# Patient Record
Sex: MALE | Race: WHITE
[De-identification: names, ages, dates, MRNs, and addresses within clinical notes are randomized per-mention and may not be internally consistent; named-entity substitution may affect disease eponyms.]

---

## 2019-11-01 ENCOUNTER — HOSPITAL ENCOUNTER (EMERGENCY)
Dept: HOSPITAL 95 - ER | Age: 29
LOS: 1 days | Discharge: HOME | End: 2019-11-02
Payer: SELF-PAY

## 2019-11-01 VITALS — WEIGHT: 159.99 LBS | HEIGHT: 67 IN | BODY MASS INDEX: 25.11 KG/M2

## 2019-11-01 DIAGNOSIS — R11.0: ICD-10-CM

## 2019-11-01 DIAGNOSIS — Z88.0: ICD-10-CM

## 2019-11-01 DIAGNOSIS — F41.9: ICD-10-CM

## 2019-11-01 DIAGNOSIS — Z79.899: ICD-10-CM

## 2019-11-01 DIAGNOSIS — F17.210: ICD-10-CM

## 2019-11-01 DIAGNOSIS — L03.113: Primary | ICD-10-CM

## 2019-11-01 DIAGNOSIS — Z88.2: ICD-10-CM

## 2019-12-15 ENCOUNTER — HOSPITAL ENCOUNTER (INPATIENT)
Dept: HOSPITAL 95 - ER | Age: 29
LOS: 3 days | Discharge: HOME | DRG: 580 | End: 2019-12-18
Attending: HOSPITALIST | Admitting: HOSPITALIST
Payer: COMMERCIAL

## 2019-12-15 VITALS — WEIGHT: 162.48 LBS | HEIGHT: 67.01 IN | BODY MASS INDEX: 25.5 KG/M2

## 2019-12-15 DIAGNOSIS — L02.415: ICD-10-CM

## 2019-12-15 DIAGNOSIS — F31.9: ICD-10-CM

## 2019-12-15 DIAGNOSIS — F90.9: ICD-10-CM

## 2019-12-15 DIAGNOSIS — L02.414: Primary | ICD-10-CM

## 2019-12-15 DIAGNOSIS — B95.0: ICD-10-CM

## 2019-12-15 DIAGNOSIS — L03.114: ICD-10-CM

## 2019-12-15 DIAGNOSIS — B95.62: ICD-10-CM

## 2019-12-15 DIAGNOSIS — F17.210: ICD-10-CM

## 2019-12-15 DIAGNOSIS — L03.115: ICD-10-CM

## 2019-12-15 LAB
ALBUMIN SERPL BCP-MCNC: 3 G/DL (ref 3.4–5)
ALBUMIN/GLOB SERPL: 0.5 {RATIO} (ref 0.8–1.8)
ALT SERPL W P-5'-P-CCNC: 15 U/L (ref 12–78)
ANION GAP SERPL CALCULATED.4IONS-SCNC: 6 MMOL/L (ref 6–16)
AST SERPL W P-5'-P-CCNC: 14 U/L (ref 12–37)
BASOPHILS # BLD AUTO: 0.07 K/MM3 (ref 0–0.23)
BASOPHILS NFR BLD AUTO: 0 % (ref 0–2)
BILIRUB SERPL-MCNC: 0.4 MG/DL (ref 0.1–1)
BUN SERPL-MCNC: 11 MG/DL (ref 8–24)
CALCIUM SERPL-MCNC: 9 MG/DL (ref 8.5–10.1)
CHLORIDE SERPL-SCNC: 104 MMOL/L (ref 98–108)
CO2 SERPL-SCNC: 25 MMOL/L (ref 21–32)
CREAT SERPL-MCNC: 0.74 MG/DL (ref 0.6–1.2)
DEPRECATED RDW RBC AUTO: 43 FL (ref 35.1–46.3)
EOSINOPHIL # BLD AUTO: 0.21 K/MM3 (ref 0–0.68)
EOSINOPHIL NFR BLD AUTO: 1 % (ref 0–6)
ERYTHROCYTE [DISTWIDTH] IN BLOOD BY AUTOMATED COUNT: 13.9 % (ref 11.7–14.2)
GLOBULIN SER CALC-MCNC: 5.7 G/DL (ref 2.2–4)
GLUCOSE SERPL-MCNC: 94 MG/DL (ref 70–99)
HCT VFR BLD AUTO: 36 % (ref 37–53)
HGB BLD-MCNC: 11.8 G/DL (ref 13.5–17.5)
IMM GRANULOCYTES # BLD AUTO: 0.2 K/MM3 (ref 0–0.1)
IMM GRANULOCYTES NFR BLD AUTO: 1 % (ref 0–1)
LYMPHOCYTES # BLD AUTO: 2.58 K/MM3 (ref 0.84–5.2)
LYMPHOCYTES NFR BLD AUTO: 12 % (ref 21–46)
MCHC RBC AUTO-ENTMCNC: 32.8 G/DL (ref 31.5–36.5)
MCV RBC AUTO: 86 FL (ref 80–100)
MONOCYTES # BLD AUTO: 1.84 K/MM3 (ref 0.16–1.47)
MONOCYTES NFR BLD AUTO: 9 % (ref 4–13)
NEUTROPHILS # BLD AUTO: 16.21 K/MM3 (ref 1.96–9.15)
NEUTROPHILS NFR BLD AUTO: 77 % (ref 41–73)
NRBC # BLD AUTO: 0 K/MM3 (ref 0–0.02)
NRBC BLD AUTO-RTO: 0 /100 WBC (ref 0–0.2)
PLATELET # BLD AUTO: 407 K/MM3 (ref 150–400)
POTASSIUM SERPL-SCNC: 4.2 MMOL/L (ref 3.5–5.5)
PROT SERPL-MCNC: 8.7 G/DL (ref 6.4–8.2)
SODIUM SERPL-SCNC: 135 MMOL/L (ref 136–145)

## 2019-12-15 PROCEDURE — C1751 CATH, INF, PER/CENT/MIDLINE: HCPCS

## 2019-12-16 LAB
AMPHETAMINES UR-MCNC: DETECTED UG/L
BASOPHILS # BLD AUTO: 0.05 K/MM3 (ref 0–0.23)
BASOPHILS NFR BLD AUTO: 0 % (ref 0–2)
CANNABINOIDS UR QL: DETECTED
DEPRECATED RDW RBC AUTO: 44.5 FL (ref 35.1–46.3)
EOSINOPHIL # BLD AUTO: 0.14 K/MM3 (ref 0–0.68)
EOSINOPHIL NFR BLD AUTO: 1 % (ref 0–6)
ERYTHROCYTE [DISTWIDTH] IN BLOOD BY AUTOMATED COUNT: 13.9 % (ref 11.7–14.2)
HCT VFR BLD AUTO: 31.6 % (ref 37–53)
HGB BLD-MCNC: 10.2 G/DL (ref 13.5–17.5)
IMM GRANULOCYTES # BLD AUTO: 0.1 K/MM3 (ref 0–0.1)
IMM GRANULOCYTES NFR BLD AUTO: 1 % (ref 0–1)
LYMPHOCYTES # BLD AUTO: 2.55 K/MM3 (ref 0.84–5.2)
LYMPHOCYTES NFR BLD AUTO: 20 % (ref 21–46)
MCHC RBC AUTO-ENTMCNC: 32.3 G/DL (ref 31.5–36.5)
MCV RBC AUTO: 86 FL (ref 80–100)
MONOCYTES # BLD AUTO: 1.46 K/MM3 (ref 0.16–1.47)
MONOCYTES NFR BLD AUTO: 12 % (ref 4–13)
NEUTROPHILS # BLD AUTO: 8.29 K/MM3 (ref 1.96–9.15)
NEUTROPHILS NFR BLD AUTO: 66 % (ref 41–73)
NRBC # BLD AUTO: 0 K/MM3 (ref 0–0.02)
NRBC BLD AUTO-RTO: 0 /100 WBC (ref 0–0.2)
OPIATES UR-MCNC: DETECTED NG/ML
PLATELET # BLD AUTO: 336 K/MM3 (ref 150–400)
VANCOMYCIN TROUGH SERPL-MCNC: 9.7 UG/ML (ref 5–10)

## 2019-12-16 PROCEDURE — 0JDH0ZZ EXTRACTION OF LEFT LOWER ARM SUBCUTANEOUS TISSUE AND FASCIA, OPEN APPROACH: ICD-10-PCS | Performed by: ORTHOPAEDIC SURGERY

## 2019-12-17 LAB
ALBUMIN SERPL BCP-MCNC: 2 G/DL (ref 3.4–5)
ANION GAP SERPL CALCULATED.4IONS-SCNC: 5 MMOL/L (ref 6–16)
BASOPHILS # BLD AUTO: 0.04 K/MM3 (ref 0–0.23)
BASOPHILS NFR BLD AUTO: 1 % (ref 0–2)
BUN SERPL-MCNC: 14 MG/DL (ref 8–24)
CALCIUM SERPL-MCNC: 8 MG/DL (ref 8.5–10.1)
CHLORIDE SERPL-SCNC: 113 MMOL/L (ref 98–108)
CO2 SERPL-SCNC: 25 MMOL/L (ref 21–32)
CREAT SERPL-MCNC: 0.72 MG/DL (ref 0.6–1.2)
DEPRECATED RDW RBC AUTO: 45.9 FL (ref 35.1–46.3)
EOSINOPHIL # BLD AUTO: 0.2 K/MM3 (ref 0–0.68)
EOSINOPHIL NFR BLD AUTO: 2 % (ref 0–6)
ERYTHROCYTE [DISTWIDTH] IN BLOOD BY AUTOMATED COUNT: 14.4 % (ref 11.7–14.2)
GLUCOSE SERPL-MCNC: 81 MG/DL (ref 70–99)
HCT VFR BLD AUTO: 29.5 % (ref 37–53)
HGB BLD-MCNC: 9.3 G/DL (ref 13.5–17.5)
IMM GRANULOCYTES # BLD AUTO: 0.11 K/MM3 (ref 0–0.1)
IMM GRANULOCYTES NFR BLD AUTO: 1 % (ref 0–1)
LYMPHOCYTES # BLD AUTO: 3.29 K/MM3 (ref 0.84–5.2)
LYMPHOCYTES NFR BLD AUTO: 38 % (ref 21–46)
MCHC RBC AUTO-ENTMCNC: 31.5 G/DL (ref 31.5–36.5)
MCV RBC AUTO: 88 FL (ref 80–100)
MONOCYTES # BLD AUTO: 1.02 K/MM3 (ref 0.16–1.47)
MONOCYTES NFR BLD AUTO: 12 % (ref 4–13)
NEUTROPHILS # BLD AUTO: 4.02 K/MM3 (ref 1.96–9.15)
NEUTROPHILS NFR BLD AUTO: 46 % (ref 41–73)
NRBC # BLD AUTO: 0 K/MM3 (ref 0–0.02)
NRBC BLD AUTO-RTO: 0 /100 WBC (ref 0–0.2)
PHOSPHATE SERPL-MCNC: 3.9 MG/DL (ref 2.5–4.9)
PLATELET # BLD AUTO: 297 K/MM3 (ref 150–400)
POTASSIUM SERPL-SCNC: 4.2 MMOL/L (ref 3.5–5.5)
SODIUM SERPL-SCNC: 143 MMOL/L (ref 136–145)
VANCOMYCIN TROUGH SERPL-MCNC: 26.3 UG/ML (ref 5–10)

## 2019-12-20 ENCOUNTER — HOSPITAL ENCOUNTER (EMERGENCY)
Dept: HOSPITAL 95 - ER | Age: 29
Discharge: HOME | End: 2019-12-20
Payer: COMMERCIAL

## 2019-12-20 VITALS — BODY MASS INDEX: 23.54 KG/M2 | WEIGHT: 150 LBS | HEIGHT: 67 IN

## 2019-12-20 DIAGNOSIS — L03.116: Primary | ICD-10-CM

## 2019-12-20 DIAGNOSIS — Z79.899: ICD-10-CM

## 2019-12-20 DIAGNOSIS — Z88.2: ICD-10-CM

## 2019-12-20 DIAGNOSIS — L03.115: ICD-10-CM

## 2019-12-20 DIAGNOSIS — Z88.0: ICD-10-CM

## 2019-12-20 DIAGNOSIS — F17.210: ICD-10-CM

## 2019-12-20 LAB
ALBUMIN SERPL BCP-MCNC: 2.8 G/DL (ref 3.4–5)
ALBUMIN/GLOB SERPL: 0.5 {RATIO} (ref 0.8–1.8)
ALT SERPL W P-5'-P-CCNC: 24 U/L (ref 12–78)
ANION GAP SERPL CALCULATED.4IONS-SCNC: 4 MMOL/L (ref 6–16)
AST SERPL W P-5'-P-CCNC: 18 U/L (ref 12–37)
BASOPHILS # BLD AUTO: 0.07 K/MM3 (ref 0–0.23)
BASOPHILS NFR BLD AUTO: 1 % (ref 0–2)
BILIRUB SERPL-MCNC: 0.1 MG/DL (ref 0.1–1)
BUN SERPL-MCNC: 15 MG/DL (ref 8–24)
CALCIUM SERPL-MCNC: 8.6 MG/DL (ref 8.5–10.1)
CHLORIDE SERPL-SCNC: 105 MMOL/L (ref 98–108)
CO2 SERPL-SCNC: 27 MMOL/L (ref 21–32)
CREAT SERPL-MCNC: 0.74 MG/DL (ref 0.6–1.2)
DEPRECATED RDW RBC AUTO: 44.8 FL (ref 35.1–46.3)
EOSINOPHIL # BLD AUTO: 0.28 K/MM3 (ref 0–0.68)
EOSINOPHIL NFR BLD AUTO: 3 % (ref 0–6)
ERYTHROCYTE [DISTWIDTH] IN BLOOD BY AUTOMATED COUNT: 14.1 % (ref 11.7–14.2)
GLOBULIN SER CALC-MCNC: 5.3 G/DL (ref 2.2–4)
GLUCOSE SERPL-MCNC: 93 MG/DL (ref 70–99)
HCT VFR BLD AUTO: 31.8 % (ref 37–53)
HGB BLD-MCNC: 10.4 G/DL (ref 13.5–17.5)
IMM GRANULOCYTES # BLD AUTO: 0.19 K/MM3 (ref 0–0.1)
IMM GRANULOCYTES NFR BLD AUTO: 2 % (ref 0–1)
LYMPHOCYTES # BLD AUTO: 2.8 K/MM3 (ref 0.84–5.2)
LYMPHOCYTES NFR BLD AUTO: 25 % (ref 21–46)
MCHC RBC AUTO-ENTMCNC: 32.7 G/DL (ref 31.5–36.5)
MCV RBC AUTO: 87 FL (ref 80–100)
MONOCYTES # BLD AUTO: 1.15 K/MM3 (ref 0.16–1.47)
MONOCYTES NFR BLD AUTO: 10 % (ref 4–13)
NEUTROPHILS # BLD AUTO: 6.83 K/MM3 (ref 1.96–9.15)
NEUTROPHILS NFR BLD AUTO: 60 % (ref 41–73)
NRBC # BLD AUTO: 0 K/MM3 (ref 0–0.02)
NRBC BLD AUTO-RTO: 0 /100 WBC (ref 0–0.2)
PLATELET # BLD AUTO: 446 K/MM3 (ref 150–400)
POTASSIUM SERPL-SCNC: 4.1 MMOL/L (ref 3.5–5.5)
PROT SERPL-MCNC: 8.1 G/DL (ref 6.4–8.2)
SODIUM SERPL-SCNC: 136 MMOL/L (ref 136–145)
TROPONIN I SERPL-MCNC: <0.015 NG/ML (ref 0–0.04)

## 2020-02-18 ENCOUNTER — HOSPITAL ENCOUNTER (EMERGENCY)
Dept: HOSPITAL 95 - ER | Age: 30
Discharge: LEFT BEFORE DISCHARGE | End: 2020-02-18
Payer: COMMERCIAL

## 2020-02-18 ENCOUNTER — HOSPITAL ENCOUNTER (EMERGENCY)
Dept: HOSPITAL 95 - ER | Age: 30
Discharge: HOME | End: 2020-02-18
Payer: COMMERCIAL

## 2020-02-18 VITALS — BODY MASS INDEX: 26.68 KG/M2 | WEIGHT: 170 LBS | HEIGHT: 67 IN

## 2020-02-18 VITALS — HEIGHT: 67 IN | WEIGHT: 170 LBS | BODY MASS INDEX: 26.68 KG/M2

## 2020-02-18 DIAGNOSIS — Z53.21: Primary | ICD-10-CM

## 2020-02-18 DIAGNOSIS — Z88.2: ICD-10-CM

## 2020-02-18 DIAGNOSIS — L03.115: Primary | ICD-10-CM

## 2020-02-18 DIAGNOSIS — F43.10: ICD-10-CM

## 2020-02-18 DIAGNOSIS — F17.210: ICD-10-CM

## 2020-02-18 DIAGNOSIS — F31.9: ICD-10-CM

## 2020-02-18 DIAGNOSIS — Z88.0: ICD-10-CM

## 2020-02-18 DIAGNOSIS — F41.9: ICD-10-CM

## 2020-02-22 ENCOUNTER — HOSPITAL ENCOUNTER (INPATIENT)
Dept: HOSPITAL 95 - ER | Age: 30
LOS: 5 days | Discharge: HOME | DRG: 572 | End: 2020-02-27
Attending: HOSPITALIST | Admitting: HOSPITALIST
Payer: COMMERCIAL

## 2020-02-22 VITALS — WEIGHT: 177.01 LBS | BODY MASS INDEX: 27.78 KG/M2 | HEIGHT: 67.01 IN

## 2020-02-22 DIAGNOSIS — Z88.2: ICD-10-CM

## 2020-02-22 DIAGNOSIS — F43.10: ICD-10-CM

## 2020-02-22 DIAGNOSIS — F31.9: ICD-10-CM

## 2020-02-22 DIAGNOSIS — A49.02: ICD-10-CM

## 2020-02-22 DIAGNOSIS — L03.115: ICD-10-CM

## 2020-02-22 DIAGNOSIS — Z88.0: ICD-10-CM

## 2020-02-22 DIAGNOSIS — F11.10: ICD-10-CM

## 2020-02-22 DIAGNOSIS — F41.9: ICD-10-CM

## 2020-02-22 DIAGNOSIS — F32.9: ICD-10-CM

## 2020-02-22 DIAGNOSIS — F17.210: ICD-10-CM

## 2020-02-22 DIAGNOSIS — Z59.0: ICD-10-CM

## 2020-02-22 DIAGNOSIS — L02.415: Primary | ICD-10-CM

## 2020-02-22 LAB
ALBUMIN SERPL BCP-MCNC: 3.3 G/DL (ref 3.4–5)
ALBUMIN/GLOB SERPL: 0.7 {RATIO} (ref 0.8–1.8)
ALT SERPL W P-5'-P-CCNC: 27 U/L (ref 12–78)
ANION GAP SERPL CALCULATED.4IONS-SCNC: 4 MMOL/L (ref 6–16)
AST SERPL W P-5'-P-CCNC: 25 U/L (ref 12–37)
BASOPHILS # BLD AUTO: 0.07 K/MM3 (ref 0–0.23)
BASOPHILS NFR BLD AUTO: 1 % (ref 0–2)
BILIRUB SERPL-MCNC: 0.4 MG/DL (ref 0.1–1)
BUN SERPL-MCNC: 11 MG/DL (ref 8–24)
CALCIUM SERPL-MCNC: 9.1 MG/DL (ref 8.5–10.1)
CHLORIDE SERPL-SCNC: 103 MMOL/L (ref 98–108)
CO2 SERPL-SCNC: 29 MMOL/L (ref 21–32)
CREAT SERPL-MCNC: 0.81 MG/DL (ref 0.6–1.2)
DEPRECATED RDW RBC AUTO: 45.3 FL (ref 35.1–46.3)
EOSINOPHIL # BLD AUTO: 0.21 K/MM3 (ref 0–0.68)
EOSINOPHIL NFR BLD AUTO: 2 % (ref 0–6)
ERYTHROCYTE [DISTWIDTH] IN BLOOD BY AUTOMATED COUNT: 14.6 % (ref 11.7–14.2)
GLOBULIN SER CALC-MCNC: 4.9 G/DL (ref 2.2–4)
GLUCOSE SERPL-MCNC: 83 MG/DL (ref 70–99)
HCT VFR BLD AUTO: 35 % (ref 37–53)
HGB BLD-MCNC: 11.4 G/DL (ref 13.5–17.5)
IMM GRANULOCYTES # BLD AUTO: 0.05 K/MM3 (ref 0–0.1)
IMM GRANULOCYTES NFR BLD AUTO: 0 % (ref 0–1)
LYMPHOCYTES # BLD AUTO: 2.96 K/MM3 (ref 0.84–5.2)
LYMPHOCYTES NFR BLD AUTO: 26 % (ref 21–46)
MCHC RBC AUTO-ENTMCNC: 32.6 G/DL (ref 31.5–36.5)
MCV RBC AUTO: 85 FL (ref 80–100)
MONOCYTES # BLD AUTO: 1.13 K/MM3 (ref 0.16–1.47)
MONOCYTES NFR BLD AUTO: 10 % (ref 4–13)
NEUTROPHILS # BLD AUTO: 6.87 K/MM3 (ref 1.96–9.15)
NEUTROPHILS NFR BLD AUTO: 61 % (ref 41–73)
NRBC # BLD AUTO: 0 K/MM3 (ref 0–0.02)
NRBC BLD AUTO-RTO: 0 /100 WBC (ref 0–0.2)
PLATELET # BLD AUTO: 368 K/MM3 (ref 150–400)
POTASSIUM SERPL-SCNC: 4.2 MMOL/L (ref 3.5–5.5)
PROT SERPL-MCNC: 8.2 G/DL (ref 6.4–8.2)
SODIUM SERPL-SCNC: 136 MMOL/L (ref 136–145)

## 2020-02-22 SDOH — ECONOMIC STABILITY - HOUSING INSECURITY: HOMELESSNESS: Z59.0

## 2020-02-22 NOTE — NUR
2619
SPOKE TO ON-CALL PHYSICIAN DR. HERNANDEZ. PATIENTS IV TO TETO NOT PATENT (REMOVED).
PERIPHERAL TO L NECK PAINFUL UPON FLUSH. 2X RN HAVE TRIED AND BEEN
UNSUCCESSFUL AT GAINING IV ACCESS. PATIENT IS REFUSING ANY FURTHER ATTEMPTS.
IV VANCOMYCIN AT 2300, BUT UNABLE TO ADMINISTER DUE TO NOT IV ACCESS. DR. HERNANDEZ
STATED TO CHART REFUSAL.

## 2020-02-22 NOTE — NUR
LATE ENTRY 1915
PATIENT OUT TO SMOKE. EXPLAINED TO ME IN REPORT THAT NEITHER OF HIS IV'S ARE
WORKING ANY LONGER. AS WELL AS WAS TOLD THAT HE COULD NOT LEAVE THE FLOOR
WHILE HE WAS HOOKED UP TO HIS IV AT AROUND 1840. PATIENT TOOK OFF AROUND 1845;
STATING GOING ON A WALK. ESCORTED BACK TO HIS ROOM BY SECURITY.

## 2020-02-22 NOTE — NUR
PT ARRIVED TO ROOM 308 FROM ER VIA [facility] AT 1820.  HE IS ALERT AND ORIENTED,
INDEPENDENT IN ROOM. HE ARRIVED WITH IV VANCO ON PUMP AND PUMP BEEPING. PT
SETTLED IN BED AND VANCO RESTARTED. PT WANTING TO GO OUTSIDE AFTER EATING HIS
DINNER, INSTRUCTED THAT HE COULD NOT GO OUT WITH THE IV POLE. WILL CONTINUE TO
MONITOR AND REPORT TO ONCOMING RN

## 2020-02-23 LAB
ANION GAP SERPL CALCULATED.4IONS-SCNC: 2 MMOL/L (ref 6–16)
BUN SERPL-MCNC: 20 MG/DL (ref 8–24)
CALCIUM SERPL-MCNC: 8.9 MG/DL (ref 8.5–10.1)
CHLORIDE SERPL-SCNC: 106 MMOL/L (ref 98–108)
CO2 SERPL-SCNC: 28 MMOL/L (ref 21–32)
CREAT SERPL-MCNC: 0.82 MG/DL (ref 0.6–1.2)
DEPRECATED RDW RBC AUTO: 47.1 FL (ref 35.1–46.3)
ERYTHROCYTE [DISTWIDTH] IN BLOOD BY AUTOMATED COUNT: 14.8 % (ref 11.7–14.2)
GLUCOSE SERPL-MCNC: 92 MG/DL (ref 70–99)
HCT VFR BLD AUTO: 38 % (ref 37–53)
HGB BLD-MCNC: 12.1 G/DL (ref 13.5–17.5)
MCHC RBC AUTO-ENTMCNC: 31.8 G/DL (ref 31.5–36.5)
MCV RBC AUTO: 87 FL (ref 80–100)
NRBC # BLD AUTO: 0 K/MM3 (ref 0–0.02)
NRBC BLD AUTO-RTO: 0 /100 WBC (ref 0–0.2)
PLATELET # BLD AUTO: 385 K/MM3 (ref 150–400)
POTASSIUM SERPL-SCNC: 4.3 MMOL/L (ref 3.5–5.5)
SODIUM SERPL-SCNC: 136 MMOL/L (ref 136–145)

## 2020-02-23 NOTE — NUR
PT HAS SLEPT, ATE, AND SMOKED THROUGH OUT THE DAY. IV REPLACED EARLIER IN THE
DAY. SURGICAL CONSULT CALLED TO [facility] .

## 2020-02-23 NOTE — NUR
0757
PT HAS NO PATENT IV FOR ORDERED IV ABX. WHEN ASKED ABOUT PUTTING IN A PICC
LINE HE SAID HE DIDNT WANT ONE AS THEY NEVER ADVANCE. THIS NURSE EXPLAINED
THAT HE IS IN NEED OF IV ABX DUE TO HIS CELLULITIS IN HIS LEG . PT STATED THAT
THE DOCTOR THEN NEEDED TO FIND AN ORAL MEDICATION TO FIX HIM. PT HAS IV IN
NECK WHICH AT THIS TIME HE STATES HURTS WHEN USED. THERE IS NO REDNESS OR
SWELLING ASSOCIATED WITH THIS SITE. PT REFUSES TO LET NURSE FLUSH LINE. DOCTOR
NOTIFIED OF THIS.

## 2020-02-23 NOTE — NUR
SHIFT SUMMARY
A/O, ABLE TO MAKE NEEDS KNOWN. COOPERATIVE WITH CARE. ANSWERS QUESTIONS
APPROPRIATELY. CURRENT EVERYDAY SMOKER; OUT TO SMOKE SEVERAL TIMES, BUT
ALERTED THIS RN PRIOR TO LEAVING. TAPER EVIDENT TAPE ON IV THAT PATIENT STATES
HURTS WHEN FLUSHED AND WOULD NOT ALLOW TO USE. C/O PAIN X1 TO RLE; MEDICATED
PER EMAR. AFEBRILE. APPEARED TO REST MUCH OF SHIFT.  NO ACUTE CHANGES NOTED
OVERNIGHT. BED IN LOWEST POSITION. CALL LIGHT AND BELONINGS WITHIN REACH.
WCTM. REPORT TO ONCOMING RN.

## 2020-02-24 NOTE — NUR
PT AOX4 CAN BE COOPERATIVE OR IMPATIENT JUST SEEMS TO CHANGE OFF AND ON. PT IS
NOT TOLERANT OF BEING POKED FOR BLOOD DRAWS AND REFUSED AM LABS. DR RODRIGUEZ
NOTIFIED AND PT WAS TALKED TO, BUT EVEN AFTER HE AGREED TO MORE TRYS NO LABS
COLLECTED. DR RODRIGUEZ CHANGE EMAR MEDS AND LABS WERE CANCELLED. PT HAS BEEN
OUT TO SMOKE X3 TODAY AND WILL TRY TO UNHOOK FROM IV IF HE HAS TO WAIT TO BE
UNHOOKED. PT HAD NAUSEA X1 AND WAS TREATED PER EMAR. WILL CONTINUE TO MONITOR.

## 2020-02-24 NOTE — NUR
SHIFT SUMMARY
A/O, ABLE TO MAKE NEEDS KNOWN. COOPERATIVE WITH CARE. CALLS AND ANSWERS
QUESTIONS APPROPRIATELY. C/O PAIN/DISCOMFORT TO RLE; MEDICATED PER EMAR.
INDEPENDENT IN ROOM. OUT TO SMOKE X1; ALERTED STAFF PRIOR TO LEAVING FLOOR AND
WAS BACK WITHIN 10 MINUTES. IV ABX INFUSED WITHOUT COMPLICATION. NO ACUTE
CHANGES NOTED OVERNIGHT. VSS/AFEBRILE. BED IN LOWEST POSITION. CALL LIGHT AND
BELONGINGS WITHIN REACH. WCTM. REPORT TO ONCOMING RN.

## 2020-02-24 NOTE — NUR
5676
PATIENT MOANING AND SCREAMING. WALKED INTO ROOM STATED, "CAN I GET A SPRITE OR
SOMETHING, MY STOMACH HURTS REALLY BAD. WENT TO GET SPRITE AND BRING IT IN.
PATIENT ACTIVELY VOMITING IN BATHROOM. GIVEN ZOFRAN IV. PATIENT PROFUSELY
SWEATING. STATED HE WOULD LIKE HIS METHADONE. THIS RN STATED I WOULD CALL THE
DOCTOR AND LET HIM KNOWN.

## 2020-02-24 NOTE — NUR
AMBULATION/OUTSIDE
PT UP TO GO FOR WALK OUTSIDE STATES HE IS "GOING TO SMOKE." WRAPPED IV IN
TAMPER TAPE & COBAN. WCTM.

## 2020-02-25 PROCEDURE — 0JBN0ZZ EXCISION OF RIGHT LOWER LEG SUBCUTANEOUS TISSUE AND FASCIA, OPEN APPROACH: ICD-10-PCS | Performed by: ORTHOPAEDIC SURGERY

## 2020-02-25 NOTE — NUR
SHIFT SUMMARY
PT UP AND AMBULATING AROUND ROOM AND HOSPITAL.  DID GO OUTSIDE AND SMOKE 1-2
TIMES TODAY DESPITE BEING ENCOURAGED TO NOT SMOKE AND EDUCATION PROVIDED ABOUT
RISKS WITH SMOKING AND SURGERY.  CONTINUED WITH PERIODS OF ANXIETY SEVERAL
TIMES TODAY BUT WAS SELF RESOLVING.  DAY SURGERY HERE TO PICK PT UP AT 1530
FOR I AND D OF R CALF.  HASN'T RETURNED AS YET FROM OR.

## 2020-02-25 NOTE — NUR
1925- PATIENT RETURNED TO ROOM FROM OR JUST AS REPORT WAS BEING GIVEN. PACU RN
ASSISTED HIM INTO BED. AS THE NURSE WAS FINISHING GIVING ME REPORT ON THE
OTHER PATIENTS, RAMIRO WAS WALKING OUT OF THE ROOM WITH HIS IV POLE FULLY
CLOTHED, STATING HE WAS GOING TO THE SNACK MACHINE. HE WAS ASKING FOR PAIN
MEDS AND FOOD ON RETURN TO THE ROOM.
 
1945- RAMIRO WAS BACK IN ROOM, DISCUSSED PAIN MANAGEMENT AND NEED TO CALL MD
FOR PAIN MED ORDERS AS NONE WERE ORDERED YET. HE ASKED TO HAVE THE SAME MEDS
HE HAD IN PACU WHICH WAS FENTANYL 200MCQ, DILUADID 1MG AND 0.5MG OF ATIVAN.
INFORMED HIM THEY PROBALLY WILL NOT ORDER THAT AS MEDS ON THE FLOOR ARE
DIFFERENT THEN OR. HE SAID OK.
 
2005- SPOKE TO KATE VOGT REGARDING PAIN MANAGMENT. INFORMED OF REQUEST AND
HISTORY. AGREED TO HAVE JUST ATIVAN FOR ANXIETY AND DILUDID FOR PAIN
MANAGEMENT.
 
2032- RAMIRO WAS IN ROOM AFTER GOING BACK OUTSIDE AGAIN FOR THE SECOND TIME.
INFORMED HIM OF THE MEDICATIONS THAT WERE ORDERED. STATES PAIN IS 9/10, GAVE
ATIVAN AND DILUDID ALONG WITH NIGHT MEDICATIONS.

## 2020-02-25 NOTE — NUR
SPOKE WITH ERIC AT [facility] ABOUT PTS CURRENT METHADONE DOSING.  PT WITHINE
EARSHOT OF CONVERSATION.  [facility] STAFF REPORTED PT WAS TO START AT 50MG TODAY
AND THEN GO UP TO 55MG ON THURSDAY FEB 28TH.  PT AWARE OF WHEN CHANGE IS
SUPPOSED TO BE.  WILL SPEAK WITH MD ABOUT WHEN NEXT INCREASE IS SUPPOSED TO
BE.  PT VERY PUSHY AND ANXIOUS ABOUT GETTING DOSING CORRECT AND REQUESTING
MULTIPLE TIMES BEFORE ACTION COULD BE TAKEN TO VERIFY METHADONE DOSING.
CALMED DOWN AFTER INFORMATION OBTAINED.

## 2020-02-25 NOTE — NUR
History, Chart, Medications and Allergies reviewed before start of
procedure.Patient confirms NPO status and agrees with scheduled surgery.
Pre-Op teaching done. Pt verbalizes understanding.

## 2020-02-25 NOTE — NUR
SHIFT SUMMARY
THIS AM @0400 PT IS NAUSEATED & STATES HE IS GOING TO BATHROOM TO THROW UP,
ASKS IF HE CAN RECIEVE ORDERED METHADONE EARLY & STATES HE USUALLY TAKES IT
AROUND 0600 IN THE MORNING. ALSO STATES HIS DOSE OF METHADONE IS SUPPOSE TO GO
UP EVERY OTHER DAY BY 5MG PER [facility] PHYSICIAN. CALLED DR DE LA FUENTE & HE CHANGED
METHADONE FOR 0600 & STATED OKAY TO GIVE EARLY, ALSO ORDERED 4MG SL ZOFRAN FOR
NAUSEA SINCE PT HAD NO IV ACCESS AT TIME OF FEELING NAUSEATED & CHARGE NURSE
WAS WORKING ON GETTING ANOTHER IV PLACED. REPORTS 8/10 PAIN IN RLE WHERE
[facility] IS LOCATED, THE RLE IS VERY RED & WARM TO TOUCH. VSS. AOX4. REPORTS IT
HAS BEEN 12 DAYS SINCE HE LAST USED HEROIN. HAS GONE OUTSIDE 5X TO SMOKE,
TAPER TAPE & COBAN HAVE BEEN PLACED ON IV, W/O EVIDENCE OF TAMPERING. PT
HAS BEEN NPO SINCE MIDNIGHT EXCEPT A SIP OF WATER W/MEDS THIS MORNING FOR
POSSIBLE I&D TODAY. CALL LIGHT IN REACH. WCTM.

## 2020-02-26 LAB
ALBUMIN SERPL BCP-MCNC: 3.2 G/DL (ref 3.4–5)
ANION GAP SERPL CALCULATED.4IONS-SCNC: 5 MMOL/L (ref 6–16)
BASOPHILS # BLD AUTO: 0.03 K/MM3 (ref 0–0.23)
BASOPHILS NFR BLD AUTO: 0 % (ref 0–2)
BUN SERPL-MCNC: 19 MG/DL (ref 8–24)
CALCIUM SERPL-MCNC: 9 MG/DL (ref 8.5–10.1)
CHLORIDE SERPL-SCNC: 106 MMOL/L (ref 98–108)
CO2 SERPL-SCNC: 25 MMOL/L (ref 21–32)
CREAT SERPL-MCNC: 0.79 MG/DL (ref 0.6–1.2)
DEPRECATED RDW RBC AUTO: 43.4 FL (ref 35.1–46.3)
EOSINOPHIL # BLD AUTO: 0.01 K/MM3 (ref 0–0.68)
EOSINOPHIL NFR BLD AUTO: 0 % (ref 0–6)
ERYTHROCYTE [DISTWIDTH] IN BLOOD BY AUTOMATED COUNT: 14.1 % (ref 11.7–14.2)
GLUCOSE SERPL-MCNC: 116 MG/DL (ref 70–99)
HCT VFR BLD AUTO: 38.9 % (ref 37–53)
HGB BLD-MCNC: 12.4 G/DL (ref 13.5–17.5)
IMM GRANULOCYTES # BLD AUTO: 0.09 K/MM3 (ref 0–0.1)
IMM GRANULOCYTES NFR BLD AUTO: 1 % (ref 0–1)
LYMPHOCYTES # BLD AUTO: 2.05 K/MM3 (ref 0.84–5.2)
LYMPHOCYTES NFR BLD AUTO: 24 % (ref 21–46)
MCHC RBC AUTO-ENTMCNC: 31.9 G/DL (ref 31.5–36.5)
MCV RBC AUTO: 85 FL (ref 80–100)
MONOCYTES # BLD AUTO: 0.89 K/MM3 (ref 0.16–1.47)
MONOCYTES NFR BLD AUTO: 10 % (ref 4–13)
NEUTROPHILS # BLD AUTO: 5.6 K/MM3 (ref 1.96–9.15)
NEUTROPHILS NFR BLD AUTO: 65 % (ref 41–73)
NRBC # BLD AUTO: 0 K/MM3 (ref 0–0.02)
NRBC BLD AUTO-RTO: 0 /100 WBC (ref 0–0.2)
PHOSPHATE SERPL-MCNC: 3.9 MG/DL (ref 2.5–4.9)
PLATELET # BLD AUTO: 412 K/MM3 (ref 150–400)
POTASSIUM SERPL-SCNC: 4.8 MMOL/L (ref 3.5–5.5)
SODIUM SERPL-SCNC: 136 MMOL/L (ref 136–145)
VANCOMYCIN TROUGH SERPL-MCNC: 11.6 UG/ML (ref 5–10)

## 2020-02-26 NOTE — NUR
1915- PATIENT VERY ANGRY AND AGGRUMENTIVE WITH STAFF, AS HE IS BEING TOLD TO
STAY IN HIS ROOM SO STAFF CAN START AN IV, THAT WAY HIS ANTIBOTICS THAT WERE
DUE AT 1700 CAN INFUSE. CURRENTLY RANDA RN IN ROOM TO CALM HIM DOWN AND TO
START AN IV.
 
1920- PATIENT STILL FRUSTRATED, RANDA GOT A GOOD 18 G IN THE LEFT UPPER ARM,
FLUSHES WELL. HE IS INSISTING TO GO OUTSIDE TO SMOKE. HE WAS TOLD THIS WAS THE
LAST TIME HE IS TO GO OUT TONIGHT. HE SAID OK.
 
1940- PATIENT BACK IN THE ROOM. HE WAS VERY ANXIOUS, LOOKING THROUGH HIS PHONE
BUT DID NOT KNOW WHAT HE WAS DOING. ASKING TO GET HOOKED UP TO HIS ANTIBOTICS
AND WAS RUSHING ME TO HURRY UP. EYES WERE PIN POINT, HE WAS NOT MAKING MUCH
SINCE AS HE WAS SCATTERED SPEECH. EVERY NOW AND THEN HIS EYES WOULD ROLL BACK
IN HIS HEAD LIKE HE WAS GOING TO PASS OUT BUT STATES NO HE FELT FINE.
VERBALIZED TO HIM THE IMPORTANCE OF THE ANTIBOTICS AND HOW SEVERE HIS
INFECTION IS. WENT TO FLUSH HIS NEW IV, AND HE YELLED IN PAIN, CUSSING AND
STATING IT HURTS. THERE WAS LARGE LUMP THAT WAS FORMED AT THE SITE. LINE WAS
NO LONGER GOOD. ASKED HIM FI ANYTHING HAPPENED TO THE LINE, HE GOT VERY
AGGRESSIVE CUSSING AND YELLING. SO WALKED OUT OF ROOM.
 
1946-PATIENT WAS BACK ON HIS PHONE TRYING TO SET UP ANOTHER PHONE, SWAYING ON
THE BENCH IN THE ROOM BACK AND FORTH, WITH EYES GLAZED OVER. HE WAS HAVING A
HARD TIME CONCENTRATING OR UNDERSTANDING WHAT WAS GOING ON. WHEN SPEAKING TO
HIM HIS COMPERHENSION WAS DIMINSHED THEN PREVIOUSLY, AND HE CONTINUED TO SAY
HE CAN'T DO MORE THEN ONE THING AT A TIME FIXATED ON THE PHONE. TRIED TO
EDUCATE HIM AGAIN ABOUT HIS IV, AND WE DISCUSSED POSSIBLE PO MEDICATIONS WITH
NO LONGER HAVING AN IV. HAD RANDA LYONS COME LOOK AT IV, SHE DISCONTINUED IT,
DUE TO NO PATENTCY. WILL CALL MD.

## 2020-02-26 NOTE — NUR
2005- PATIENT STANDING IN THE MIDDLE OF THE ROOM, IN A DAZE, SWAYING BACK AND
FORTH, EYES GLAZED OVER. LIKE HE WAS OFF IN ANOTHER WORLD. ASKED IF HE WAS OK,
HE SAID YES. ADMINISTERED HIS NIGHT TIME MEDS. INFORMED HIM AGAIN ABOUT THE IV
AND POSSIBLY NEEDING TO TALK TO THE DOCTOR AS I HAVE NOT BEEN ABLE TO DO SO.
 
2030-RANDA WAS HEADING IN THE ROOM TO START AN IV, INFORMED HER TO HOLD OFF
TILL I TALK TO THE DOCTOR. SHE SAID THERE SHOULD HAVE NOT BEEN ANY REASON WHY
THAT LAST IV WENT BAD.
 
2045 - PATIENT HEADING OUTSIDE AGAIN, AS I WAS TALKING TO THE DOCTOR ABOUT HIS
IV. SPOKE TO DR. DE LA FUENTE ABOUT THE ISSUES OF HIM GOING OUTSIDE AND EVERY TIME HIS
IV NO LONGER WORKS. INFORMED HIM ABOUT HIS BEHAVIOR CHANGE AND THAT THERE IS
SUSPECION OF DRUG USE. ASKED IF IV CAN BE DC'S AND MEDS CHANGED TO PO. HE
STATED THAT HIS INFECTION REQUIRES IV VANCO, THEREFORE ORDER WAS RECEIVED THAT
THE PATIENT MUST REMAIN IN THE ROOM, IF HE LEAVES THE ROOM TO DISCHARGE HIM
AMA. ALSO GOT ORDER FOR NICOTINE PATCH.
 
2110- CNA REPORTED THAT MAYA FINALLY CAME BACK TO ROOM BUT GRABBED HIS JACKET
AND HEADED BACK OUTSIDE.
 
2158- MAYA RETURNED TO HIS ROOM WITH SNACKS AND DRINKS. INFORMED HIM HE MUST
STAY IN THE ROOM AS WE NEED TO START THE IV AND GIVE ANTIBOTICS. INFORMED HIM
CHARGE RN CECI WILL DO IT BY ULTRASOUND WHEN SHE HAS A MOMENT. HE AGREED TO
STAY IN THE ROOM.

## 2020-02-26 NOTE — NUR
RAMIRO IS AWAKE AND IMMEDIATLY ASKED FOR PAIN MEDICATION AND ANXIETY MEDS.
REMINED HIM IT WAS JUST GIVEN . HE SAID HE DOES NOT REMEMBER IT. REPORTS
HIS PAIN IS 10/10, AND HE FEELS HE NEEDS TO PLAY CATCH UP. INFORMED HIM HE HAS
NOT MISSED ANY DOSE SO FAR, HE WAS SHOCKED TO HEAR THAT. TYLENOL GIVEN
INSTEAD. BLOOD PRESSURE LOW POSSIBLY RELATED TO PAIN MEDICATION, INFORMED HIM
THAT HE SHOULD TRY AND PUSH OFF THE MEDICATION OR TAKE HALF DOSE. HE SAID HE
WILL WAIT BUT TAKE A FULL DOSE. SO HE GOT UP AND WALKED OUTSIDE TO SEE IT WILL
BRING HIS BP UP.

## 2020-02-26 NOTE — NUR
SHIFT SUMMARY: MAT ARRIVED FROM PACU AT START OF SHIFT. IT WAS DIFFICULT TO DO
POST OP VITALS AS HE CONTINUED TO LEAVE THE ROOM, TO GO GET SNACKS AS HE PUT
IT. HE COMPLAINED OF PAIN AS SOON AS HE ARRIVED BEFORE HE WAS EVEN TRANSFERRED
OUT OF BED. THERE WERE NO MEDICAL FLOOR ORDERS SO HAD TO CALL MD FOR PAIN
MEDICATION AND ANXIETY MEDS. KATE NP ORDERED DILAUDID IV AND ATIVAN PO. THIS
WAS GIVEN, AND PATIENT REQUESTED THESE TWO MEDS ON THE DOT THROUGHOUT THE
NIGHT. HE HAS BEEN IN AND OUT OF HIS ROOM ALL NIGHT, FOR SHORT PERIODS OF
TIME. HE DID SLEEP FOR A FEW HOURS ONLY. IV CONTINUED TO INFUSE AT KVO TO KEEP
LINE OPEN. VS HAVE REMAINED STABLE WITH SMALL DROP IN BP BUT RETAKE WAS
NORMAL. PAIN WAS MANAGED WITH DILUDID. INFORMED PATIENT THAT HE WILL NOT BE
HAVING THE PAIN MEDICATION FOR LONG AS DAY SHIFT WILL BE CHANGING HIS PAIN MED
FOR HIM TO BE DISCHARGED HOME. THIS AM LAB NOTICED HE HAD IV FLUSHES IN HIS
POCKET. ASKED THE PATIENT IF HE HAD THEM, HE PULLED OUT A FLUSH FROM HIS
POCKET. WHEN ASKED WHY HE HAD IT HE TOLD THIS RN THAT HE FOUND IT ON THE FLOOR
IT FEEL OFF HIS IV POLE. THIS NURSE NEVER LEFT A FLUSH IN THE ROOM LAST NIGHT,
NOT SURE WHERE HE GOT THE FLUSH FROM. DRESSING STAYED INTACT ALL NIGHT, NO
DRAINAGE NOTED. DAY SHIFT RN NOTIFED OF EVENTS THROGHOUT THE NIGHT.

## 2020-02-26 NOTE — NUR
SHIFT SUMMARY
PT WAS PUSHY THIS MORNING WHEN HE WANTED HIS MEDICATIONS.  HAD CALLED WITH
CALL BUTTON AND WHEN HE WAS TOLD THIS NURSE WOULD BE THERE AS SOON AS I WAS
ABLE TO GET TO HIM, HE WATCHED THE CLOCK AND STARTED PACING THE HALLS WITH HIS
SHIRT OFF LOOKING FOR ME.  EXPLAINED TO PT HE WAS NOT TO BE OUT IN THE
HALLWAYS SEEKING ME OUT WHEN HE HAD BEEN TOLD I WOULD BE TO HIS ROOM AS SOON
AS I COULD.  WAS FORCEFUL AGAIN IN REGARDS TO HIS MEDS AFTER THAT INCIDENT BUT
HAS BEHAVED TOWARD STAFF BETTER SINCE THEN TODAY. HAS GONE OUT OF THE ROOM
SEVERAL TIMES TODAY WITH MOST OF THOSE TIMES BEING REMINDED HE ISN'T TO TAKE
IV PUMP AND POLE OUTSIDE.  WAS OUTSIDE FOR JUST PAST AN HOUR AND WAS NOTIFIED
OF 1 HOUR TIME LIMIT.  DRESSING REMAINS IN PLACE TO RLE.

## 2020-02-27 NOTE — NUR
WENT TO GO FLUSH IV FOR ZOFRAN ADMINISTRATION. IV BLEW. REMOVED IV DID NOT
GIVE ZOFRAN, INFORMED CECI LYONS CHARGE OF IV BLOWING, PATIENT HAS BEEN IN ROOM
MOST OF THE NIGHT AND WAS NOT MESSING WITH THE IV THIS TIME FOR IT TO GO BAD.
WILL LET DAY SHIFT KNOW,

## 2020-02-27 NOTE — NUR
DISCHARGE INSTRUCTIONS COMPLETED AND DISCUSSED WITH PT EXPRESSING
UNDERSTANDING.  R CALF WOUND DRESSING CHANGED PER DR. GAITAN.  METHADONE
DOSE CHANGED PER CONVERSATION WITH [facility] ON TUESDAY AND PER DR. MACK.
WAITING FOR HIS GRANDMA TO PICK HIM UP.  LAYING IN BED SLEEPING

## 2020-02-27 NOTE — NUR
SHIFT SUMMARY: MAYA WAS VERY IRRITABLE, ANGRY AT START OF SHIFT DUE TO NEEDING
IV ANTIBOTICS THAT WERE LATE AND IV GOING BAD. RANDA LYONS WAS ABLE TO PLACE AN
18 G RIGHT OFF START. HE INSISTED THAT HE GO OUTSIDE RIGHT AFTER. WHEN HE CAME
BACK NOT ONLY WAS THE IV BAD, BUT HIS BEHAVIOR WAS DIFFERENT. HE HAD TROUBLE
COMPERHENDING CONVERSATION, WAS MORE IN A DAZE, EYES WERE GLAZED OVER, AND HE
COULDN;T KEEP HIS EYES OPEN. HE WAS ALSO MORE IRRITABLE, AGGITATED, WITH
FIGITING AND SWAYING BACK AND FORTH. THERE WERE SEVERAL TIMES HE CONCERNED US
THAT HE MIGHT PASS OUT AND FALL OVER. DUE TO IV'S CONTINUING TO GO BAD,
ESPECIALLY AFTER GOING OUTSIDE, DR. DE LA FUENTE WAS INFORMED GIVEING US AN ORDER TO
KEEP HIM IN HIS ROOM, IF HE LEFT HE WOULD GET DC'S AMA. HE WAS INFORMED OF
THIS AFTER HE RETURNED TO HIS ROOM, AND NEW IV WAS STARTED. HE WAS ALLOWED TO
WALK ONLY THE HALLWAY BUT NOT TO LEAVE THE FLOOR. IV VANCO WAS GIVEN LATE AND
TIMES ADJUSTED. IV WAS SL AS IT WAS POSITIONAL AND CONTINUED TO BEEP. HE DID
ATTEMPT TO LEAVE ONE OTHER TIME AND SECURITY FOUND HIM IN A DAZE IN THE STAIR
WELL. AFTER THAT HE AGREED TO STAY IN ROOM. HE SLEPT FOR A FEW HOURS WAKING UP
THIS MORNING. WENT TO FLUSH IV AND IT BLEW AGAIN. MEDS WERE GIVEN WHEN
REQUESTED. HE CONTINUED TO COMPLAIN OF PAIN EVEN AFTER MEDS GIVEN, DISPITE HIM
FALLING ASLEEP MOST OF THE TIME. NEW IV WAS STARTED THIS AM BEFORE SHIFT
CHANGE. HE WAS EDUCATED ON THE ORDER TO NOT LEAVE AND THE RISK FACTORS SEVERAL
TIMES THROUGHOUT THE NIGHT. VS REMAINED STABLE. NO OTHER CHANGES TO REPORT.

## 2020-02-27 NOTE — NUR
PT AMBULATED SELF OUT OF HOSPITAL WITH BELONGINGS AND INFORMATION.  DIDN'T
NOTIFY ANYONE OF RIDE BEING HERE AND LEAVING ROOM.

## 2020-02-27 NOTE — NUR
0345- MAYA WOKE UP WALKING THE RUTLEDGE, SLIGHTLY CONFUSED, ASKING WHAT HAPPENED
WITH HIS ANTIBOTIC. HE DOES NOT REMEMBER THAT I WENT IN THE ROOM HAD
DISCONNECTED HIM FROM THE IV AROUND 0050. INFORMED HIM NEXT DOSE WAS NOT TILL
8 AM, ENCOURAGED HIM TO LAY BACK DOWN AND SLEEP FOR THE REST OF THE NIGHT.

## 2020-02-27 NOTE — NUR
0200- WHILE ON LUNCH, RECEIVED A CALL THAT THE PATIENT LEFT HIS ROOM, AND THE
FLOOR DISPITE BEING TOLD AGAIN BY THE CNA THAT HE IS NOT TO LEAVE. HE SAID HE
WAS JUST GOING DOWN TO THE VENDING MACHINES. CNA CALLED CHARGE NURSE CECI AND
SHE CALLED SECURITY TO BRING HIM BACK TO THE ROOM. THEY HAD FOUND HIM IN THE
STAIR WELL. WAS INFORMED HE WAS VERY ANGRY ABOUT IT. WHEN ARRIVED TO THE ROOM
AFTER LUNCH HE WAS SOUND ASLEEP IN HIS BED.

## 2020-02-27 NOTE — NUR
2325-CECI IN THE ROOM STARTING AN IV VIA ULTRASOUND. PATIENT IS LAYING IN THE
BED, REALLY OUT OF IT, COULD BARLEY KEEP HIS EYES OPEN. SPEAKING IN SHORT
PHRASES, BUT STILL SLIGHTLY CONFUSED ABOUT HIS SURROUNDINGS AND WHAT IS
HAPPENING. ASKING FOR PAIN MEDICATION AND ANXIETY MEDS. WAS QUESTIONABLE AT
FIRST WHETHER TO GIVE THEM, BUT AFTER SHE GOT THE IV STARTED HE JUMPED OUT OF
BED TO GO USE THE BATHROOM. HAD HIS HOLD ON TO SECURE THE SITE, WHEN THE IV
STARTED TO BEEP AND THIS FRUSTRATED HIM IMMEDIATLY CAUSING HIS ANXIETY TO GO
UP AND HIM BECOME IRRITABLE IN SECONDS. SHE HAD TO ADJUST THE IV A LITTLE FOR
IT TO FLUSH DUE TO VALVE. SHE GOT IT FIXED AND VANCO WAS INFUSING WELL. CECI
RN AND I INFORMED HIM AT THIS TIME THE ORDER FROM DR. DE LA FUENTE TO STAY IN THE
ROOM, IF HE WAS TO LEAVE HE WOULD BE DISCHARGED. EXPLAINED TO HIM THE RISK OF
HIM BEING DISCHARGED AMA WITH THE SEVERITY OF HIS INFECTION. EXPLAINED TO HIM
THE CONCERNS ABOUT HIS IV AND THAT HE CONTINUES TO LOOSE THEM. ENCOURAGED HIM
TO STAY AND GET THE TREATMENT. WENT THROUGH THIS SEVERAL TIMES WITH HIM
EXPLAINING THE ORDER SO HE WOULD UNDERSTAND. HE AGREED TO STAY IN THE ROOM.
 
2050- PATIENT WAS IN BATHROOM WHEN IV WAS SOUNDING. HE HAD TURNED OFF THE
MACHINE CAUSE IT WAS MAKING TO MUCH NOISE. GAVE HIM HIS ANXIETY MEDICATION AS
REQUESTED. THEN STARTED TO HEAD OUT OF THE ROOM. INFORMED HIM AGAIN OF THE
ORDER AND THAT HE MUST STAY IN THE ROOM. HE SAID HE JUST NEEDED TO GO FOR A
SHORT WALK TO HELP HIS ANXIETY. INFORMED HIM HE COULD ONLY GO IN THE RUTLEDGE BUT
HE MUST NOT LEAVE THE FLOOR AT ALL. IF HE DOES IT IS A IMMEDIATE DISCHARGE.
 
0010-PATIENT STAYED ON THE FLOOR AND NOW IS BACK IN THE ROOM.

## 2021-02-07 ENCOUNTER — HOSPITAL ENCOUNTER (EMERGENCY)
Dept: HOSPITAL 95 - ER | Age: 31
Discharge: HOME | End: 2021-02-07
Payer: COMMERCIAL

## 2021-02-07 VITALS — HEIGHT: 67 IN | BODY MASS INDEX: 29.82 KG/M2 | WEIGHT: 189.99 LBS

## 2021-02-07 DIAGNOSIS — R00.0: ICD-10-CM

## 2021-02-07 DIAGNOSIS — F17.210: ICD-10-CM

## 2021-02-07 DIAGNOSIS — Z76.0: Primary | ICD-10-CM

## 2021-02-07 DIAGNOSIS — F41.9: ICD-10-CM

## 2021-02-07 PROCEDURE — A9270 NON-COVERED ITEM OR SERVICE: HCPCS

## 2022-01-20 ENCOUNTER — HOSPITAL ENCOUNTER (EMERGENCY)
Dept: HOSPITAL 95 - ER | Age: 32
Discharge: HOME | End: 2022-01-20
Payer: COMMERCIAL

## 2022-01-20 VITALS — BODY MASS INDEX: 31.39 KG/M2 | WEIGHT: 200 LBS | HEIGHT: 67 IN
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
## 2022-01-20 DIAGNOSIS — F17.210: ICD-10-CM

## 2022-01-20 DIAGNOSIS — S62.634B: Primary | ICD-10-CM

## 2022-01-20 DIAGNOSIS — Z88.2: ICD-10-CM

## 2022-01-20 DIAGNOSIS — Z88.0: ICD-10-CM

## 2022-01-21 ENCOUNTER — HOSPITAL ENCOUNTER (EMERGENCY)
Dept: HOSPITAL 95 - ER | Age: 32
Discharge: HOME | End: 2022-01-21
Payer: COMMERCIAL

## 2022-01-21 VITALS — BODY MASS INDEX: 31.39 KG/M2 | WEIGHT: 200 LBS | HEIGHT: 67 IN

## 2022-01-21 DIAGNOSIS — Z88.2: ICD-10-CM

## 2022-01-21 DIAGNOSIS — F17.210: ICD-10-CM

## 2022-01-21 DIAGNOSIS — S62.634A: Primary | ICD-10-CM

## 2022-01-21 DIAGNOSIS — Z88.0: ICD-10-CM
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
## 2022-01-21 PROCEDURE — A9270 NON-COVERED ITEM OR SERVICE: HCPCS

## 2022-08-05 ENCOUNTER — HOSPITAL ENCOUNTER (OUTPATIENT)
Dept: HOSPITAL 95 - LAB | Age: 32
End: 2022-08-05
Payer: COMMERCIAL

## 2022-08-05 DIAGNOSIS — F90.2: Primary | ICD-10-CM

## 2022-08-05 LAB
AMPHETAMINES UR SCN-MCNC: DETECTED NG/ML
METHADONE UR-MCNC: DETECTED UG/L